# Patient Record
Sex: FEMALE | Race: OTHER | NOT HISPANIC OR LATINO | URBAN - METROPOLITAN AREA
[De-identification: names, ages, dates, MRNs, and addresses within clinical notes are randomized per-mention and may not be internally consistent; named-entity substitution may affect disease eponyms.]

---

## 2019-07-08 ENCOUNTER — EMERGENCY (EMERGENCY)
Facility: HOSPITAL | Age: 39
LOS: 0 days | Discharge: HOME | End: 2019-07-08
Admitting: EMERGENCY MEDICINE
Payer: SELF-PAY

## 2019-07-08 VITALS
TEMPERATURE: 96 F | HEART RATE: 80 BPM | DIASTOLIC BLOOD PRESSURE: 55 MMHG | SYSTOLIC BLOOD PRESSURE: 115 MMHG | OXYGEN SATURATION: 100 % | RESPIRATION RATE: 19 BRPM

## 2019-07-08 VITALS — HEIGHT: 67 IN | WEIGHT: 123.02 LBS

## 2019-07-08 DIAGNOSIS — M79.89 OTHER SPECIFIED SOFT TISSUE DISORDERS: ICD-10-CM

## 2019-07-08 DIAGNOSIS — M79.669 PAIN IN UNSPECIFIED LOWER LEG: ICD-10-CM

## 2019-07-08 PROCEDURE — 99284 EMERGENCY DEPT VISIT MOD MDM: CPT

## 2019-07-08 PROCEDURE — 99053 MED SERV 10PM-8AM 24 HR FAC: CPT

## 2019-07-08 PROCEDURE — 93971 EXTREMITY STUDY: CPT | Mod: 26,LT

## 2019-07-08 NOTE — ED ADULT NURSE NOTE - OBJECTIVE STATEMENT
Patient cut leg shaving last week and it got infected. She was placed on ABX. Patient c/o swelling and muscle spasm in left leg today. On assessment left leg +2 edema noted. No heat or redness present in left leg. +CMS in left left +cap refill. Denies pain or discomfort at this time in left leg.

## 2019-07-08 NOTE — ED PROVIDER NOTE - OBJECTIVE STATEMENT
38yo female with no significant PMHx presents c/o L lower extremity swelling x 2-3 days. Patient reports she accidently cut her L lower leg shaving 5 days prior and developed some redness around laceration, seen at urgent care and started on abx. Patient reports wound has been well healing but L foot and lower leg started to swell over the last 2 days. No associated pain. No red streaking. No SOB or CP. She states she routinely has swollen feet often alleviated with elevation. However she became concerned as clotting disorders run in her family.

## 2019-07-08 NOTE — ED PROVIDER NOTE - PROVIDER TOKENS
FREE:[LAST:[CARE PROVIDER],FIRST:[YOUR PRIMARY],PHONE:[(   )    -],FAX:[(   )    -],FOLLOWUP:[1-3 Days]]

## 2019-07-08 NOTE — ED PROVIDER NOTE - NS ED ROS FT
CONST: No fever, chills or bodyaches  CARD: No chest pain, palpitations  RESP: No SOB, cough  MS: see HPI  SKIN: No rashes

## 2019-07-08 NOTE — ED PROVIDER NOTE - PHYSICAL EXAMINATION
CONST: Well appearing in NAD  CARD: Normal S1 S2; Normal rate and rhythm  RESP: Equal BS B/L, No wheezes, rhonchi or rales. No distress  MS: Soft, bilateral pedal edema and L lower leg 1-2+  SKIN: L lateral lower leg, just superior to ankle with vertical well healed deep abrasion. No surrounding cellulitis.   NEURO: A&Ox3, No focal deficits. Strength 5/5 with no sensory deficits.

## 2022-03-19 ENCOUNTER — EMERGENCY (EMERGENCY)
Facility: HOSPITAL | Age: 42
LOS: 0 days | Discharge: HOME | End: 2022-03-19
Attending: EMERGENCY MEDICINE | Admitting: EMERGENCY MEDICINE
Payer: COMMERCIAL

## 2022-03-19 VITALS
OXYGEN SATURATION: 99 % | HEIGHT: 67 IN | DIASTOLIC BLOOD PRESSURE: 75 MMHG | TEMPERATURE: 97 F | SYSTOLIC BLOOD PRESSURE: 118 MMHG | HEART RATE: 83 BPM | RESPIRATION RATE: 18 BRPM

## 2022-03-19 DIAGNOSIS — E87.6 HYPOKALEMIA: ICD-10-CM

## 2022-03-19 DIAGNOSIS — R07.81 PLEURODYNIA: ICD-10-CM

## 2022-03-19 LAB
ALBUMIN SERPL ELPH-MCNC: 4.9 G/DL — SIGNIFICANT CHANGE UP (ref 3.5–5.2)
ALP SERPL-CCNC: 78 U/L — SIGNIFICANT CHANGE UP (ref 30–115)
ALT FLD-CCNC: 16 U/L — SIGNIFICANT CHANGE UP (ref 0–41)
ANION GAP SERPL CALC-SCNC: 12 MMOL/L — SIGNIFICANT CHANGE UP (ref 7–14)
AST SERPL-CCNC: 18 U/L — SIGNIFICANT CHANGE UP (ref 0–41)
BASOPHILS # BLD AUTO: 0.04 K/UL — SIGNIFICANT CHANGE UP (ref 0–0.2)
BASOPHILS NFR BLD AUTO: 0.7 % — SIGNIFICANT CHANGE UP (ref 0–1)
BILIRUB SERPL-MCNC: 1.4 MG/DL — HIGH (ref 0.2–1.2)
BUN SERPL-MCNC: 6 MG/DL — LOW (ref 10–20)
CALCIUM SERPL-MCNC: 9.3 MG/DL — SIGNIFICANT CHANGE UP (ref 8.5–10.1)
CHLORIDE SERPL-SCNC: 102 MMOL/L — SIGNIFICANT CHANGE UP (ref 98–110)
CO2 SERPL-SCNC: 24 MMOL/L — SIGNIFICANT CHANGE UP (ref 17–32)
CREAT SERPL-MCNC: 0.6 MG/DL — LOW (ref 0.7–1.5)
D DIMER BLD IA.RAPID-MCNC: 73 NG/ML DDU — SIGNIFICANT CHANGE UP (ref 0–230)
EGFR: 115 ML/MIN/1.73M2 — SIGNIFICANT CHANGE UP
EOSINOPHIL # BLD AUTO: 0.08 K/UL — SIGNIFICANT CHANGE UP (ref 0–0.7)
EOSINOPHIL NFR BLD AUTO: 1.5 % — SIGNIFICANT CHANGE UP (ref 0–8)
GLUCOSE SERPL-MCNC: 114 MG/DL — HIGH (ref 70–99)
HCG SERPL QL: NEGATIVE — SIGNIFICANT CHANGE UP
HCT VFR BLD CALC: 40.5 % — SIGNIFICANT CHANGE UP (ref 37–47)
HGB BLD-MCNC: 13.2 G/DL — SIGNIFICANT CHANGE UP (ref 12–16)
IMM GRANULOCYTES NFR BLD AUTO: 0 % — LOW (ref 0.1–0.3)
LYMPHOCYTES # BLD AUTO: 1.96 K/UL — SIGNIFICANT CHANGE UP (ref 1.2–3.4)
LYMPHOCYTES # BLD AUTO: 35.6 % — SIGNIFICANT CHANGE UP (ref 20.5–51.1)
MAGNESIUM SERPL-MCNC: 1.9 MG/DL — SIGNIFICANT CHANGE UP (ref 1.8–2.4)
MCHC RBC-ENTMCNC: 29.9 PG — SIGNIFICANT CHANGE UP (ref 27–31)
MCHC RBC-ENTMCNC: 32.6 G/DL — SIGNIFICANT CHANGE UP (ref 32–37)
MCV RBC AUTO: 91.8 FL — SIGNIFICANT CHANGE UP (ref 81–99)
MONOCYTES # BLD AUTO: 0.33 K/UL — SIGNIFICANT CHANGE UP (ref 0.1–0.6)
MONOCYTES NFR BLD AUTO: 6 % — SIGNIFICANT CHANGE UP (ref 1.7–9.3)
NEUTROPHILS # BLD AUTO: 3.09 K/UL — SIGNIFICANT CHANGE UP (ref 1.4–6.5)
NEUTROPHILS NFR BLD AUTO: 56.2 % — SIGNIFICANT CHANGE UP (ref 42.2–75.2)
NRBC # BLD: 0 /100 WBCS — SIGNIFICANT CHANGE UP (ref 0–0)
NT-PROBNP SERPL-SCNC: 19 PG/ML — SIGNIFICANT CHANGE UP (ref 0–300)
PLATELET # BLD AUTO: 277 K/UL — SIGNIFICANT CHANGE UP (ref 130–400)
POTASSIUM SERPL-MCNC: 3.2 MMOL/L — LOW (ref 3.5–5)
POTASSIUM SERPL-SCNC: 3.2 MMOL/L — LOW (ref 3.5–5)
PROT SERPL-MCNC: 7.8 G/DL — SIGNIFICANT CHANGE UP (ref 6–8)
RBC # BLD: 4.41 M/UL — SIGNIFICANT CHANGE UP (ref 4.2–5.4)
RBC # FLD: 12.3 % — SIGNIFICANT CHANGE UP (ref 11.5–14.5)
SODIUM SERPL-SCNC: 138 MMOL/L — SIGNIFICANT CHANGE UP (ref 135–146)
TROPONIN T SERPL-MCNC: <0.01 NG/ML — SIGNIFICANT CHANGE UP
WBC # BLD: 5.5 K/UL — SIGNIFICANT CHANGE UP (ref 4.8–10.8)
WBC # FLD AUTO: 5.5 K/UL — SIGNIFICANT CHANGE UP (ref 4.8–10.8)

## 2022-03-19 PROCEDURE — 99285 EMERGENCY DEPT VISIT HI MDM: CPT

## 2022-03-19 PROCEDURE — 71046 X-RAY EXAM CHEST 2 VIEWS: CPT | Mod: 26

## 2022-03-19 PROCEDURE — 93010 ELECTROCARDIOGRAM REPORT: CPT

## 2022-03-19 RX ORDER — POTASSIUM CHLORIDE 20 MEQ
40 PACKET (EA) ORAL ONCE
Refills: 0 | Status: COMPLETED | OUTPATIENT
Start: 2022-03-19 | End: 2022-03-19

## 2022-03-19 RX ADMIN — Medication 40 MILLIEQUIVALENT(S): at 15:07

## 2022-03-19 NOTE — ED PROVIDER NOTE - PATIENT PORTAL LINK FT
You can access the FollowMyHealth Patient Portal offered by [facility] by registering at the following website: [email or web address]. By joining [employer]’s FollowMyHealth portal, you will also be able to view your health information using other applications (apps) compatible with our system.

## 2022-03-19 NOTE — ED PROVIDER NOTE - NS ED ATTENDING STATEMENT MOD
This was a shared visit with the TIO. I reviewed and verified the documentation and independently performed the documented:

## 2022-03-19 NOTE — ED PROVIDER NOTE - OBJECTIVE STATEMENT
41 y/o female with no significant PMH presents to the ED for evaluation of left side pleuritic chest pain radiating to the left arm that began about two hours ago. pt reports she experienced these symptoms twice about a week ago but it resolved, however the symptoms this time have become more constant. pt reports mothers side of the family has factor V leiden, and both of her parents have had DVTs but she is unsure why. pt denies fever, chills, back pain, sob, abdominal pain, n/v/d/c, leg pain, leg swelling, recent travel, recent trauma, recent surgeries, recent hospitalizations, hx of cancer, use of hormones, dizziness, rashes, cigarette smoking, or illicit drug use.

## 2022-03-19 NOTE — ED ADULT NURSE NOTE - NSIMPLEMENTINTERV_GEN_ALL_ED
Implemented All Universal Safety Interventions:  [place] to call system. Call bell, personal items and telephone within reach. Instruct patient to call for assistance. Room bathroom lighting operational. Non-slip footwear when patient is off stretcher. Physically safe environment: no spills, clutter or unnecessary equipment. Stretcher in lowest position, wheels locked, appropriate side rails in place.

## 2022-03-19 NOTE — ED PROVIDER NOTE - CLINICAL SUMMARY MEDICAL DECISION MAKING FREE TEXT BOX
Labs and imaging obtained. Pt was observed on cardiac monitor. Results reviewed and discussed with pt.  Potassium was replaced.  Pt  to follow up as outpatient with cardiology. Strict return precautions discussed. Pt instructed to return if any worsening symptoms or concerns.  They verbalize understanding..

## 2022-03-19 NOTE — ED PROVIDER NOTE - PHYSICAL EXAMINATION
Physical Exam    Vital Signs: I have reviewed the initial vital signs.  Constitutional: well-nourished, appears stated age, no acute distress  Eyes: Conjunctiva pink, Sclera clear  ENT: Oropharynx is clear with lesions. uvula midline. no tonsillar erythema, edema, or exudates. no stridor. pta pta. floor of the mouth is soft without pus.   Cardiovascular: S1 and S2, regular rate, regular rhythm, well-perfused extremities, radial pulses equal and 2+ b/l.   Respiratory: unlabored respiratory effort, clear to auscultation bilaterally no wheezing, rales and rhonchi. pt is speaking full sentences. no accessory muscle use.   Gastrointestinal: soft, non-tender, nondistended abdomen, no pulsatile mass, normal bowl sounds, no rebound, no guarding  Musculoskeletal: supple neck, no lower extremity edema, no calf tenderness  Integumentary: warm, dry, no rash  Neurologic: awake, alert, cranial nerves II-XII grossly intact, extremities’ motor and sensory functions. steady gait.   Psychiatric: appropriate mood, appropriate affect

## 2022-03-19 NOTE — ED PROVIDER NOTE - ATTENDING CONTRIBUTION TO CARE
42-year-old female no significant past medical history presents for evaluation of left-sided chest pain that radiated to the arm that began earlier today while at work.  Patient states that she had similar symptoms in the past 2 weeks but it resolved on its own.  No and/pain, no shortness of breath, no recent illness.  No history of tobacco or drug use.  Patient states there is a positive family history of factor V and DVTs. On exam pt in NAD AAO x 3, appears well, Lungs cta b/l, no wrr, no mur, abd soft nt nd, no edema, no rash, no calf tenderness

## 2022-03-19 NOTE — ED PROVIDER NOTE - CARE PLAN
Principal Discharge DX:	Chest pain   1 Principal Discharge DX:	Chest pain  Secondary Diagnosis:	Hypokalemia

## 2022-03-19 NOTE — ED PROVIDER NOTE - CARE PROVIDER_API CALL
Tina Bills)  Cardiovascular Disease; Internal Medicine  [address] [place] [address]  Phone: [phone]  Fax: [phone]  Follow Up Time: 1-3 Days

## 2024-09-05 NOTE — ED PROVIDER NOTE - EKG ADDITIONAL QUESTION - PERFORMED INDEPENDENT VISUALIZATION
Please arrive 20 minutes prior to your scheduled time to see the doctor to allow sufficient time for check-in and rooming.      Please bring all your prescription bottles to each appointment.      I recommend all standard healthcare maintenance and cancer screenings (Colon cancer screening if due, Lung cancer screening if due, Mammogram and Bone Density if female and appropriate, etc) and standard immunizations (Flu, Pneumonia, Covid-19, RSV, Shingrix, Tetanus boosters, etc) as appropriate and due to lower your risk for potentially preventable morbidity/mortality from these diseases.     Check BP 1-2 times per week.  Call our office if BP runs above 140/80.     Recommend tobacco cessation if you use tobacco products.     Yes